# Patient Record
Sex: FEMALE | Race: BLACK OR AFRICAN AMERICAN | NOT HISPANIC OR LATINO | URBAN - METROPOLITAN AREA
[De-identification: names, ages, dates, MRNs, and addresses within clinical notes are randomized per-mention and may not be internally consistent; named-entity substitution may affect disease eponyms.]

---

## 2022-06-23 ENCOUNTER — EMERGENCY (EMERGENCY)
Facility: HOSPITAL | Age: 52
LOS: 1 days | Discharge: HOME | End: 2022-06-23
Admitting: EMERGENCY MEDICINE
Payer: SELF-PAY

## 2022-06-23 VITALS
HEART RATE: 65 BPM | SYSTOLIC BLOOD PRESSURE: 142 MMHG | RESPIRATION RATE: 18 BRPM | OXYGEN SATURATION: 100 % | DIASTOLIC BLOOD PRESSURE: 89 MMHG | TEMPERATURE: 98 F | WEIGHT: 160.06 LBS | HEIGHT: 61 IN

## 2022-06-23 DIAGNOSIS — S93.602A UNSPECIFIED SPRAIN OF LEFT FOOT, INITIAL ENCOUNTER: ICD-10-CM
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
## 2022-06-23 DIAGNOSIS — M25.572 PAIN IN LEFT ANKLE AND JOINTS OF LEFT FOOT: ICD-10-CM
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
## 2022-06-23 DIAGNOSIS — S90.32XA CONTUSION OF LEFT FOOT, INITIAL ENCOUNTER: ICD-10-CM

## 2022-06-23 PROCEDURE — 73620 X-RAY EXAM OF FOOT: CPT | Mod: 26

## 2022-06-23 PROCEDURE — 73610 X-RAY EXAM OF ANKLE: CPT | Mod: 26

## 2022-06-23 PROCEDURE — 99284 EMERGENCY DEPT VISIT MOD MDM: CPT | Mod: 25

## 2022-06-23 PROCEDURE — 73630 X-RAY EXAM OF FOOT: CPT | Mod: 26,LT

## 2022-06-23 PROCEDURE — 73630 X-RAY EXAM OF FOOT: CPT

## 2022-06-23 PROCEDURE — 73610 X-RAY EXAM OF ANKLE: CPT

## 2022-06-23 PROCEDURE — 73610 X-RAY EXAM OF ANKLE: CPT | Mod: 26,LT

## 2022-06-23 RX ORDER — IBUPROFEN 200 MG
600 TABLET ORAL ONCE
Refills: 0 | Status: COMPLETED | OUTPATIENT
Start: 2022-06-23 | End: 2022-06-23

## 2022-06-23 RX ADMIN — Medication 600 MILLIGRAM(S): at 13:31

## 2022-06-23 NOTE — ED PROVIDER NOTE - OBJECTIVE STATEMENT
53 y/o F with no PMHX presents to the ED c/o left ankle foot pain after an inversion injury when she had missed a step and lead to no subsequent fall. Denies any head trauma or LOC. Pt is not able to bear weight s/p incident. Pt has no prior injury to foot or ankle. Pt has not taken any medications for pain control. Pt has no skin breaks, numbness or calf pain, knee pain or any other injuries. 51 y/o F with no PMHX presents to the ED c/o left ankle and foot pain after an inversion injury when she had missed a step- no subsequent fall. Denies any head trauma or LOC. Pt is not able to bear weight s/p incident, has not tried. Pt has no prior injury to foot or ankle. Pt has not taken any medications for pain control. Pt has no skin breaks, numbness or calf pain, knee pain or any other injuries.

## 2022-06-23 NOTE — ED PROVIDER NOTE - CLINICAL SUMMARY MEDICAL DECISION MAKING FREE TEXT BOX
53 y/o F with no PMHX presents to the ED c/o left ankle and foot pain after an inversion injury when she had missed a step- no subsequent fall, no head trauma or loc.  on exam LLE - swelling and ecchymosis with hematoma to lateral foot over 3rd-5th prox metatarsal, + swelling and tenderness inferior to lat malleoli. Full ROM to knee and toes. DP PT pulses 2+, SILT, cap refill less than 2 seconds.  xray L ankle/foot reviewed w/ rads, no e/o fracture or dislocation.  suspect sprain, will give ace wrap/surgical shoe for support and educated on RICE.  can f/u outpt w/ podiatry as needed.  discussed strict return parameters

## 2022-06-23 NOTE — ED PROVIDER NOTE - PATIENT PORTAL LINK FT
You can access the FollowMyHealth Patient Portal offered by [facility] by registering at the following website: [email or web address]. By joining [employer] FollowMyHealth portal, you will also be able to view your health information using other applications (apps) compatible with our system.

## 2022-06-23 NOTE — ED PROVIDER NOTE - PHYSICAL EXAMINATION
Vitals reviewed  Gen: well appearing, nad, speaking in full sentences  Skin: wwp, no rash/lesions  HEENT: ncat, eomi, mmm  CV: rrr, no audible m/r/g  Resp: symmetrical expansion, ctab, no w/r/r  Abd: nondistended, soft/nt  Ext: FROM throughout, no peripheral edema  LLE - swelling and ecchymosis with hematoma to lateral foot with swelling and tenderness over lateral inferior malleoli. Full ROM to knee and toes. DP PT pulses 2+, SILT, cap refill less than 2 seconds.     Neuro: alert/oriented, no focal deficits, steady gait Gen: well appearing, no acute distress  Skin: warm/dry, no rash noted  Resp: breathing comfortably, speaking in full sentences, no dyspnea  LLE - swelling and ecchymosis with hematoma to lateral foot over 3rd-5th prox metatarsal, + swelling and tenderness inferior to lat malleoli. Full ROM to knee and toes. DP PT pulses 2+, SILT, cap refill less than 2 seconds.  Neuro: alert/oriented, no focal deficits

## 2022-06-23 NOTE — ED PROVIDER NOTE - CARE PROVIDER_API CALL
Michael Velazquez (ZIM)  Podiatric Medicine and Surgery  [address], [address]  [place] [address]  Phone: [phone]  Fax: [phone]  Follow Up Time:

## 2022-06-23 NOTE — ED PROVIDER NOTE - NSFOLLOWUPINSTRUCTIONS_ED_ALL_ED_FT
Take tylenol 650mg or motrin 400-800mg as needed every 4-6 hours for pain.   REST- Rest your hurting/injured joint or extremity to decrease pain and swelling for 24-48 hours    ICE- Apply ice to area of pain to decreased inflammation and pain, put towel/barrier between ice and skin. You can keep ice on for 20 minutes at a time 4-8 times daily   COMPRESSION- Wear ace wrap or brace for support to reduce swelling.  Make sure not to wrap too tight, loosen if skin feeling numb/tingling or skin turns blue   ELEVATION- Elevate hurting/injured area 6 or more inches about level of heart to decrease swelling/inflammation.  Use pillow under joint to elevate area    Please call to arrange follow up in podiatry clinic within one week   [address]  [phone]      Foot Sprain       A foot sprain is an injury to one of the ligaments in the feet. Ligaments are strong tissues that connect bones to each other. The ligament can be stretched too much. In some cases, it may tear. A tear can be either partial or complete. The severity of the sprain depends on how much of the ligament was damaged or torn.      What are the causes?    This condition is usually caused by suddenly twisting or pivoting your foot.      What increases the risk?    You are more likely to develop this condition if:  •You play a sport, such as basketball or football.      •You exercise or play a sport without first warming up your muscles.      •You start a new workout or sport.      •You suddenly increase how long or hard you exercise or play a sport.      •You have injured your foot or ankle before.        What are the signs or symptoms?    Symptoms of this condition start soon after an injury and include:  •Pain, especially in the arch of your foot.      •Bruising.      •Swelling.      •Being unable to walk or use your foot to support body weight.        How is this diagnosed?    This condition is diagnosed with a medical history and physical exam. You may also have imaging tests, such as:  •X-rays to check for broken bones (fractures).      •An MRI to see if the ligament is torn.        How is this treated?    Treatment for this condition depends on the severity of the sprain. Mild sprains and major sprains can be treated with:  •Rest, ice, pressure (compression), and elevation (RICE). Elevation means raising your injured foot.      •Keeping your foot in a fixed position (immobilization) for a period of time. This is done if your ligament is overstretched or partially torn. Your health care provider will apply a bandage, splint, or walking boot to keep your foot from moving until it heals.      •Using crutches or a scooter for a few weeks to avoid bearing weight on your foot while it is healing.      •Physical therapy exercises to improve movement and strength in your foot.      Major sprains may also be treated with:  •Surgery. This is done if your ligament is fully torn and a procedure is needed to reconnect it to the bone.      •A cast or splint. This will be needed after surgery. A cast or splint will need to stay on your foot while it heals.        Follow these instructions at home:    If you have a bandage, splint, or boot:     •Wear it as told by your health care provider. Remove it only as told by your health care provider.      •Loosen it if your toes tingle, become numb, or turn cold and blue.      •Keep it clean and dry.      If you have a cast:     • Do not put pressure on any part of the cast until it is fully hardened. This may take several hours.      • Do not stick anything inside the cast to scratch your skin. Doing that increases your risk for infection.      •Check the skin around the cast every day. Tell your health care provider about any concerns.      •You may put lotion on dry skin around the edges of the cast. Do not put lotion on the skin underneath the cast.      •Keep it clean and dry.      Bathing     • Do not take baths, swim, or use a hot tub until your health care provider approves. Ask your health care provider if you may take showers. You may only be allowed to take sponge baths.    •If the bandage, splint, boot, or cast is not waterproof:  •Do not let it get wet.      •Cover it with a watertight covering when you take a bath or shower.          Managing pain, stiffness, and swelling    •If directed, put ice on the injured area. To do this:  •If you have a removable bandage, splint, or boot, remove it as told by your health care provider.      •Put ice in a plastic bag.      •Place a towel between your skin and the bag, or between your cast and the bag.      •Leave the ice on for 20 minutes, 2–3 times per day.      •Remove the ice if your skin turns bright red. This is very important. If you cannot feel pain, heat, or cold, you have a greater risk of damage to the area.        •Move your toes often to reduce stiffness and swelling.      •Elevate the injured area above the level of your heart while you are sitting or lying down.      Activity     • Do not use the injured foot to support your body weight until your health care provider says that you can. Use crutches or a scooter as told by your health care provider.      •Ask your health care provider what activities are safe for you. Do exercises as told by your health care provider.      •Gradually increase how much and how far you walk until your health care provider says it is safe to return to full activity.      Driving     •Ask your health care provider if the medicine prescribed to you requires you to avoid driving or using machinery.      •Ask your health care provider when it is safe to drive if you have a bandage, splint, boot, or cast on your foot.      General instructions     •Take over-the-counter and prescription medicines only as told by your health care provider.      •When you can walk without pain, wear supportive shoes that have stiff soles. Do not wear flip-flops. Do not walk barefoot.      •Keep all follow-up visits. This is important.        Contact a health care provider if:    •Medicine does not help your pain.      •Your bruising or swelling gets worse or does not get better with treatment.      •Your splint, boot, or cast is damaged.        Get help right away if:    •You develop severe numbness or tingling in your foot.      •Your foot turns blue, white, or gray, and it feels cold.        Summary    •A foot sprain is an injury to one of the ligaments in the feet. Ligaments are strong tissues that connect bones to each other.      •You may need a bandage, splint, boot, or cast to support your foot while it heals. Sometimes, surgery may be needed.      •You may need physical therapy exercises to improve movement and strength in your foot.      This information is not intended to replace advice given to you by your health care provider. Make sure you discuss any questions you have with your health care provider.

## 2022-06-23 NOTE — ED ADULT NURSE NOTE - OBJECTIVE STATEMENT
Patient a+o x4 c/o left ankle pain s/p missing a step and tripping. Patient states unable to apply weight to area. Speaking in full sentences without difficulty, denies sob/cp/dizziness/n/v/fever/chills. Patient at XR, pending results.

## 2022-12-05 NOTE — ED PROVIDER NOTE - CARE PLAN
Recommend pelvic rest increase water intake, recommend check hcg and type and screen and we can bring her in for US and pelvic exam sooner if available.    1 Principal Discharge DX:	Sprain of left foot

## 2024-06-19 NOTE — ED ADULT TRIAGE NOTE - CCCP TRG CHIEF CMPLNT
ANTICOAGULATION MANAGEMENT     Won Urena 69 year old female is on warfarin with subtherapeutic INR result. (Goal INR 2.0-3.0)    Recent labs: (last 7 days)     06/19/24  1039   INR 1.42*     Started warfarin 6/16/24--Day 4 of starting warfarin    ASSESSMENT     Warfarin Lab Questionnaire    Warfarin Doses Last 7 Days      6/18/2024     8:56 AM   Dose in Tablet or Mg   TAB or MG? milligram (mg)     Pt Rptd Dose MONDAY TUESDAY 6/18/2024   8:56 AM 3 3         6/18/2024   Warfarin Lab Questionnaire   Missed doses within past 14 days? No   Changes in diet or alcohol within past 14 days? No   Medication changes since last result? Yes   Please list: I just  started Warfarin in hospital after open heart last Wednesday.   Injuries or illness since last result? No   New shortness of breath, severe headaches or sudden changes in vision since last result? No   Abnormal bleeding since last result? No   Upcoming surgery, procedure? No        Previous result: Subtherapeutic  Additional findings:  Won reports having diarrhea since leaving hospital--she states it is improving.  Started warfarin 6/16/24--Day 4 of starting warfarin  [facility] consult-first 10 days after surgery (bioprosthetic aortic valve replacement) and CABG    Refill needed today. Won meets all criteria for refill (current [facility] referral, office visit with referring provider/group in last 1 year unless directed to return in 2 years in last referring provider visit note, lab monitoring up to date or not exceeding 2 weeks overdue). Rx instructions and quantity supplied updated to match patient's current dosing plan. Warfarin 90 day supply with 1 refill granted per [facility] protocol        PLAN     Recommended plan for no diet, medication or health factor changes affecting INR     Dosing Instructions: Continue your current warfarin dose with next INR in 5 days)   (The daily dose is the same as the average dose she has been taking for the last 3 days)    Summary   As of 6/19/2024      Full warfarin instructions:  4.5 mg every Sun, Wed; 3 mg all other days   Next INR check:  6/24/2024               Telephone call with Won who verbalizes understanding and agrees to plan and who agrees to plan and repeated back plan correctly    Lab visit scheduled    Education provided:   Taking warfarin: prescribed tablet strength and color and importance of following [facility] instructions vs instructions on the prescription bottle  Importance of notifying anticoagulation clinic for: changes in medications; a sooner lab recheck maybe needed, diarrhea, nausea/vomiting, reduced intake, cold/flu, and/or infections; a sooner lab recheck maybe needed, and upcoming surgeries and procedures 2 weeks in advance    Plan made with [facility] Pharmacist Liliana Hong RN  Anticoagulation Clinic  6/19/2024    _______________________________________________________________________     Anticoagulation Episode Summary       Current INR goal:  2.0-3.0   TTR:  --   Target end date:  9/19/2024   Send INR reminders to:  [facility]    Indications    Severe aortic stenosis [I35.0]  Coronary artery disease due to calcified coronary lesion [I25.10  I25.84]             Comments:               Anticoagulation Care Providers       Provider Role Specialty Phone number    Mitali Nicole MD Referring Thoracic Surgery [phone]    Nannette Gallagher MD Referring Family Medicine [phone]             ankle pain/injury

## 2025-02-06 NOTE — ED ADULT NURSE NOTE - NS ED NURSE DC INFO COMPLEXITY
Left msg for pt regarding need to cancel appt 8/4/25  @ 1:30 PM due to provider being on vacation. Provided phone# [phone] to reschedule.  Advised that Tanvir is scheduling out in to Sept but that our new nurse practitioner Tara is scheduling for Aug and works closely with Tanvir  
Verbalized Understanding